# Patient Record
Sex: FEMALE
[De-identification: names, ages, dates, MRNs, and addresses within clinical notes are randomized per-mention and may not be internally consistent; named-entity substitution may affect disease eponyms.]

---

## 2019-01-14 PROBLEM — Z00.00 ENCOUNTER FOR PREVENTIVE HEALTH EXAMINATION: Status: ACTIVE | Noted: 2019-01-14

## 2019-01-22 ENCOUNTER — APPOINTMENT (OUTPATIENT)
Dept: PLASTIC SURGERY | Facility: CLINIC | Age: 44
End: 2019-01-22
Payer: COMMERCIAL

## 2019-01-22 VITALS — HEIGHT: 68 IN | BODY MASS INDEX: 31.98 KG/M2 | WEIGHT: 211 LBS

## 2019-01-22 DIAGNOSIS — Z80.3 FAMILY HISTORY OF MALIGNANT NEOPLASM OF BREAST: ICD-10-CM

## 2019-01-22 DIAGNOSIS — I10 ESSENTIAL (PRIMARY) HYPERTENSION: ICD-10-CM

## 2019-01-22 DIAGNOSIS — Z72.3 LACK OF PHYSICAL EXERCISE: ICD-10-CM

## 2019-01-22 DIAGNOSIS — Z78.9 OTHER SPECIFIED HEALTH STATUS: ICD-10-CM

## 2019-01-22 DIAGNOSIS — N62 HYPERTROPHY OF BREAST: ICD-10-CM

## 2019-01-22 PROCEDURE — 99243 OFF/OP CNSLTJ NEW/EST LOW 30: CPT

## 2019-01-25 NOTE — HISTORY OF PRESENT ILLNESS
[FreeTextEntry1] : a breast reduction consult. Pt states she is been severe neck, shoulder and back pain, taking Motrin as needed and  reports she never tried PT. Pt is c/o indent on shoulders and rash under breasts. CBS is 44DDD and states she would like a 38C. Pt weights 211 pounds and is 5'8 tall (BMI 32.08 - BSA 2.09) and reports she is comfortable w/current weight. Pt has 2 children and denies any Hx of . Pt has a family history of breast cancer (paternal great aunt), pt's last mammo was done around 2018 (normal). Pt is here to discuss w/MD. \par \par Pt also reports recurrent rashes under breasts that have been treated on several occasions. Has tried OTC pain meds, like Tylenol and Advil for greater than 3 mths without relief of neck and back pain. Breats size and pain affecting ability to do daily activities.

## 2019-01-25 NOTE — CONSULT LETTER
[Dear  ___] : Dear  [unfilled], [Consult Letter:] : I had the pleasure of evaluating your patient, [unfilled]. [Please see my note below.] : Please see my note below. [Sincerely,] : Sincerely, [FreeTextEntry2] : Dr Andrei Ramírez [FreeTextEntry1] : I will send additional  correspondence once the procedure is complete.\par \par  Please let me know if you have any questions and if I can ever be of further assistance.  I am a pediatric and adult plastic surgeon who specializes in  skin cancers and pediatric craniofacial anomalies including:facial trauma and paralysis, rhinoplasty,reconstruction and scar revision, as well as many other deformities. Please view our website [email or web address], to see more information on our multispecialty collaborations.  I also participate in most insurance plans, including managed care plans. Thank you again for allowing me to participate in the care of our mutual patient.\par \par  [FreeTextEntry3] : Andrei Acosta MD, FAAP\par [facility], FNP-BC\par Pediatric and Adult\par Craniofacial, Reconstructive and Plastic Surgery\par

## 2021-07-12 NOTE — REASON FOR VISIT
Gynecologic oncology telephone note    Spoke with Lynne's sister, Ayanna, to discuss findings at surgery.  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  We discussed concluding the procedure after a diagnostic laparoscopy, and proceeding further with a larger surgery (most likely with open technique) at a later date.    We will admit Lynne to the hospital so IR can remove the PNTs, and then coordinate surgery with myself, colorectal surgery and urology.      All questions answered.  Support given.    Yanna Torres MD.  [facility]     [address] [place] [address]  Phone: [phone]  Pager:  [phone]     [Spouse] : spouse [FreeTextEntry1] : a breast reduction consult. Pt states she is been severe neck, shoulder and back pain, taking Motrin as needed and  reports she never tried PT. Pt is c/o indent on shoulders and rash under breasts. CBS is 44DDD and states she would like a 38C. Pt weights 211 pounds and is 5'8 tall (BMI 32.08 - BSA 2.09) and reports she is comfortable w/current weight. Pt has 2 children and denies any Hx of . Pt has a family history of breast cancer (paternal great aunt), pt's last mammo was done around 2018 (normal). Pt is here to discuss w/MD.

## 2021-09-17 ENCOUNTER — APPOINTMENT (OUTPATIENT)
Dept: GASTROENTEROLOGY | Facility: CLINIC | Age: 46
End: 2021-09-17